# Patient Record
Sex: FEMALE | Race: WHITE | Employment: OTHER | URBAN - METROPOLITAN AREA
[De-identification: names, ages, dates, MRNs, and addresses within clinical notes are randomized per-mention and may not be internally consistent; named-entity substitution may affect disease eponyms.]

---

## 2024-02-29 ENCOUNTER — APPOINTMENT (OUTPATIENT)
Age: 59
End: 2024-02-29
Payer: COMMERCIAL

## 2024-02-29 ENCOUNTER — HOSPITAL ENCOUNTER (EMERGENCY)
Age: 59
Discharge: HOME | End: 2024-02-29
Attending: EMERGENCY MEDICINE
Payer: COMMERCIAL

## 2024-02-29 VITALS
RESPIRATION RATE: 16 BRPM | TEMPERATURE: 98 F | DIASTOLIC BLOOD PRESSURE: 64 MMHG | HEIGHT: 65 IN | WEIGHT: 106.7 LBS | OXYGEN SATURATION: 100 % | HEART RATE: 51 BPM | SYSTOLIC BLOOD PRESSURE: 118 MMHG | BODY MASS INDEX: 17.78 KG/M2

## 2024-02-29 DIAGNOSIS — M54.50 DORSALGIA OF THORACOLUMBAR REGION: Primary | ICD-10-CM

## 2024-02-29 DIAGNOSIS — M54.6 DORSALGIA OF THORACOLUMBAR REGION: Primary | ICD-10-CM

## 2024-02-29 PROCEDURE — 99283 EMERGENCY DEPT VISIT LOW MDM: CPT

## 2024-02-29 PROCEDURE — 72100 X-RAY EXAM L-S SPINE 2/3 VWS: CPT

## 2024-02-29 PROCEDURE — 72070 X-RAY EXAM THORAC SPINE 2VWS: CPT

## 2024-02-29 PROCEDURE — 6370000000 HC RX 637 (ALT 250 FOR IP): Performed by: EMERGENCY MEDICINE

## 2024-02-29 RX ORDER — OXYCODONE HYDROCHLORIDE AND ACETAMINOPHEN 5; 325 MG/1; MG/1
1 TABLET ORAL ONCE
Status: COMPLETED | OUTPATIENT
Start: 2024-02-29 | End: 2024-02-29

## 2024-02-29 RX ADMIN — OXYCODONE HYDROCHLORIDE AND ACETAMINOPHEN 1 TABLET: 5; 325 TABLET ORAL at 16:42

## 2024-02-29 ASSESSMENT — PAIN DESCRIPTION - ORIENTATION: ORIENTATION: RIGHT

## 2024-02-29 ASSESSMENT — LIFESTYLE VARIABLES
HOW MANY STANDARD DRINKS CONTAINING ALCOHOL DO YOU HAVE ON A TYPICAL DAY: PATIENT DOES NOT DRINK
HOW OFTEN DO YOU HAVE A DRINK CONTAINING ALCOHOL: NEVER

## 2024-02-29 ASSESSMENT — PAIN - FUNCTIONAL ASSESSMENT
PAIN_FUNCTIONAL_ASSESSMENT: 0-10
PAIN_FUNCTIONAL_ASSESSMENT: 0-10

## 2024-02-29 ASSESSMENT — PAIN DESCRIPTION - LOCATION
LOCATION: BACK

## 2024-02-29 ASSESSMENT — PAIN SCALES - GENERAL
PAINLEVEL_OUTOF10: 3
PAINLEVEL_OUTOF10: 10
PAINLEVEL_OUTOF10: 10

## 2024-02-29 ASSESSMENT — PAIN DESCRIPTION - DESCRIPTORS: DESCRIPTORS: ACHING;PRESSURE

## 2024-02-29 NOTE — ED NOTES
Patient discharged to home in stable condition with family via private car.  Discharge instructions reviewed with patient.   Patient verbalized understanding.   Patient advised not to drive, drink ETOH or operate machinery while taking pain medications at home.  Patient and family verbalized understanding.   All belongings in tow including discharge paperwork.

## 2024-02-29 NOTE — ED PROVIDER NOTES
General: There is no distension.      Palpations: Abdomen is soft.      Tenderness: There is no abdominal tenderness.   Musculoskeletal:      Cervical back: Normal, normal range of motion and neck supple. No tenderness.      Thoracic back: Tenderness present. No bony tenderness. Normal range of motion.      Lumbar back: Tenderness present. No bony tenderness. Normal range of motion.        Back:    Skin:     General: Skin is warm and dry.      Capillary Refill: Capillary refill takes less than 2 seconds.   Neurological:      General: No focal deficit present.      Mental Status: She is alert and oriented to person, place, and time.   Psychiatric:         Mood and Affect: Mood normal.         Behavior: Behavior normal.          DIAGNOSTIC RESULTS     LABS:   Labs Reviewed - No data to display   When ordered only abnormal lab results are displayed. All other labs were within normal range or not returned as of this dictation.     RADIOLOGY:      Non-plain film images such as CT, Ultrasound and MRI are read by the radiologist. Plain radiographic images are visualized and preliminarily interpreted by the ED Provider with the below findings:   Interpretation per the Radiologist below, if available at the time of this note:  XR LUMBAR SPINE (2-3 VIEWS)   Final Result   Vascular calcification. No evidence of fracture or subluxation.      Electronically signed by Thomas John      XR THORACIC SPINE (2 VIEWS)   Final Result      Normal thoracic spine.      Electronically signed by Thomas John               EKG: none     PROCEDURES None    Unless otherwise noted below, none     CRITICAL CARE TIME   I personally saw the patient and independently provided 0 minutes of non-concurrent critical care out of the total shared critical care time excluding separately billable procedures.        Vitals:    Vitals:    02/29/24 1621   BP: 135/65   Pulse: 77   Resp: 21   Temp: 98 °F (36.7 °C)   TempSrc: Oral   SpO2: 100%

## 2025-08-17 ENCOUNTER — HOSPITAL ENCOUNTER (EMERGENCY)
Age: 60
Discharge: HOME | End: 2025-08-17
Attending: EMERGENCY MEDICINE
Payer: COMMERCIAL

## 2025-08-17 VITALS
OXYGEN SATURATION: 97 % | BODY MASS INDEX: 23.01 KG/M2 | HEIGHT: 65 IN | WEIGHT: 138.1 LBS | HEART RATE: 58 BPM | DIASTOLIC BLOOD PRESSURE: 49 MMHG | TEMPERATURE: 97.8 F | RESPIRATION RATE: 16 BRPM | SYSTOLIC BLOOD PRESSURE: 94 MMHG

## 2025-08-17 DIAGNOSIS — S50.861A INSECT BITE OF RIGHT FOREARM, INITIAL ENCOUNTER: Primary | ICD-10-CM
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
## 2025-08-17 PROCEDURE — 99282 EMERGENCY DEPT VISIT SF MDM: CPT

## 2025-08-17 ASSESSMENT — LIFESTYLE VARIABLES
HOW OFTEN DO YOU HAVE A DRINK CONTAINING ALCOHOL: NEVER
HOW MANY STANDARD DRINKS CONTAINING ALCOHOL DO YOU HAVE ON A TYPICAL DAY: PATIENT DOES NOT DRINK

## 2025-08-17 ASSESSMENT — PAIN DESCRIPTION - ONSET: ONSET: ON-GOING

## 2025-08-17 ASSESSMENT — PAIN DESCRIPTION - LOCATION: LOCATION: ARM

## 2025-08-17 ASSESSMENT — PAIN DESCRIPTION - FREQUENCY: FREQUENCY: CONTINUOUS

## 2025-08-17 ASSESSMENT — PAIN DESCRIPTION - PAIN TYPE: TYPE: ACUTE PAIN

## 2025-08-17 ASSESSMENT — PAIN SCALES - GENERAL
PAINLEVEL_OUTOF10: 6
PAINLEVEL_OUTOF10: 6

## 2025-08-17 ASSESSMENT — PAIN - FUNCTIONAL ASSESSMENT: PAIN_FUNCTIONAL_ASSESSMENT: ACTIVITIES ARE NOT PREVENTED

## 2025-08-17 ASSESSMENT — PAIN DESCRIPTION - DESCRIPTORS: DESCRIPTORS: BURNING;ACHING

## 2025-08-17 ASSESSMENT — PAIN DESCRIPTION - ORIENTATION: ORIENTATION: RIGHT
